# Patient Record
Sex: FEMALE | URBAN - METROPOLITAN AREA
[De-identification: names, ages, dates, MRNs, and addresses within clinical notes are randomized per-mention and may not be internally consistent; named-entity substitution may affect disease eponyms.]

---

## 2024-09-25 ENCOUNTER — LAB OUTSIDE AN ENCOUNTER (OUTPATIENT)
Dept: URBAN - METROPOLITAN AREA CLINIC 115 | Facility: CLINIC | Age: 64
End: 2024-09-25

## 2024-09-25 ENCOUNTER — OFFICE VISIT (OUTPATIENT)
Dept: URBAN - METROPOLITAN AREA CLINIC 115 | Facility: CLINIC | Age: 64
End: 2024-09-25
Payer: MEDICARE

## 2024-09-25 ENCOUNTER — DASHBOARD ENCOUNTERS (OUTPATIENT)
Age: 64
End: 2024-09-25

## 2024-09-25 VITALS
BODY MASS INDEX: 43.28 KG/M2 | DIASTOLIC BLOOD PRESSURE: 102 MMHG | SYSTOLIC BLOOD PRESSURE: 139 MMHG | WEIGHT: 259.8 LBS | HEART RATE: 77 BPM | HEIGHT: 65 IN | TEMPERATURE: 97.2 F

## 2024-09-25 DIAGNOSIS — K43.9 VENTRAL HERNIA WITHOUT OBSTRUCTION OR GANGRENE: ICD-10-CM

## 2024-09-25 DIAGNOSIS — M79.605 TENDERNESS OF LEFT LOWER EXTREMITY: ICD-10-CM

## 2024-09-25 DIAGNOSIS — R22.43 LOCALIZED SWELLING OF BOTH LOWER LEGS: ICD-10-CM

## 2024-09-25 DIAGNOSIS — Z12.11 SCREEN FOR COLON CANCER: ICD-10-CM

## 2024-09-25 DIAGNOSIS — Z79.01 ON CONTINUOUS ORAL ANTICOAGULATION: ICD-10-CM

## 2024-09-25 DIAGNOSIS — I48.0 PAROXYSMAL A-FIB: ICD-10-CM

## 2024-09-25 PROBLEM — 309298003: Status: ACTIVE | Noted: 2024-09-25

## 2024-09-25 PROBLEM — 414396006: Status: ACTIVE | Noted: 2024-09-25

## 2024-09-25 PROBLEM — 282825002: Status: ACTIVE | Noted: 2024-09-25

## 2024-09-25 PROCEDURE — 99244 OFF/OP CNSLTJ NEW/EST MOD 40: CPT

## 2024-09-25 RX ORDER — LISINOPRIL AND HYDROCHLOROTHIAZIDE 10; 12.5 MG/1; MG/1
1 TABLET TABLET ORAL ONCE A DAY
Status: ACTIVE | COMMUNITY

## 2024-09-25 RX ORDER — EMPAGLIFLOZIN 10 MG/1
1 TABLET TABLET, FILM COATED ORAL ONCE A DAY
Status: ACTIVE | COMMUNITY

## 2024-09-25 NOTE — HPI-TODAY'S VISIT:
Pt is a 65 y/o Italian F with PMH of HTN, HLD, paroxysmal afib on eliquis, CHF in clinic on referral from Dr. Susan Gross for colonoscopy. A copy of this note will be sent to referring provider.  Daughter with pt to help translate. Had CT 8/6/24 showing mild-mod stool burden, large fat/bowel containing ventral hernia measuring 7cm x 10cm with delayed transit, no obstruction, also some lumbar changes rec pre/postcontrast MRI. Has appt with a general surgeon today. Denies PMH of MI, stroke, seizures, asthma, COPD, pacemaker, defibrillator, and family hx of CRC. Reports intermittent chest pain and shortness of breath. Sometimes has abdominal pain related to her hernia. BMs are daily, complete evacuation sometimes.  Denies n/v, diarrhea, constipation, blood in stool, dysphagia, odynophagia, heartburn, unintentional weight loss, change in appetite, early satiety. Sometimes use ibuprofen for headache. Denies EtOH/tobacco/marijuana use.  Last colonoscopy: 10-15 yrs ago, unsure of results. States she recently had a vein in her L leg burst and had to get it sewn back up